# Patient Record
(demographics unavailable — no encounter records)

---

## 2025-03-28 NOTE — DISCUSSION/SUMMARY
[de-identified] : This patient has left hip osteoarthritis that is severe.  I had a discussion with the patient, who is a candidate for total hip replacement. Risks, benefits and alternatives were discussed. At this point, the patient wants to hold off as the pain is significantly more than I would anticipate it is not well-controlled at this time.  I am concerned that performing hip replacement in the setting to be difficult to control pain postoperatively otherwise.  An extensive discussion was conducted on the natural history of the disease and the variety of surgical and non-surgical options available to the patient including, but not limited to non-steroidal anti-inflammatory medications, steroid injections, physical therapy, maintenance of ideal body weight, and reduction of activity.  He will continue with Vicodin and Celebrex.  This patient is taking narcotics preoperatively and understands our narcotic cessation policy and that continued preoperative usage of narcotics will make it more difficult to control postoperative pain. Further, the patient understands that the orthopedic literature demonstrates an increased risk to the patient of periprosthetic joint infection, dissatisfaction, chronic pain, and impaired overall outcome in patients who have preoperative exposure to narcotics prior to total joint replacement. The patient and I formulated a plan to decrease and/or cease narcotic use prior to surgery if possible in order to mitigate some of these risks.  Physical therapy prescribed.  He may consider left hip cortisone injection. The patient will schedule an appointment as needed.

## 2025-03-28 NOTE — PHYSICAL EXAM
[de-identified] : Patient is well nourished, well-developed, in no acute distress, with appropriate mood and affect. The patient is oriented to time, place, and person. Respirations are even and unlabored. Gait evaluation reveals a limp. There is no inguinal adenopathy. Examination of the contralateral hip shows normal range of motion, strength, no tenderness, and intact skin. The affected limb is well-perfused, shows a grossly normal motor and sensory examination. Examination of the hip shows no skin lesions. Hip motion is reduced and causes pain. FADIR is positive and NUHA is positive. Stinchfield test is positive. Leg lengths are approximately equal. Both hips are stable and muscle strength is normal. Pedal pulses are palpable. [de-identified] : AP pelvis, AP and lateral hip radiographs of the left hip were ordered and taken in the office and demonstrate degenerative joint disease of the hip with joint space narrowing, osteophyte formation, and subchondral sclerosis.  The patient presented an MRI of the left hip.  Reviewed imaging with the patient was demonstrates left hip osteoarthritis.

## 2025-03-28 NOTE — HISTORY OF PRESENT ILLNESS
[de-identified] : This is very nice 69-year-old gentleman experiencing left hip pain, which is severe in intensity. The pain substantially limits activities of daily living. Walking tolerance is reduced.  He has had 6 point surgeries.  Pain and restriction of function are intolerable at this time.  He takes Vicodin and Celebrex for his pain management doctor.  He ambulates with a walker.  The patient denies any radiation of the pain to the feet and it is not associated with numbness, tingling, or weakness.

## 2025-05-23 NOTE — HISTORY OF PRESENT ILLNESS
[de-identified] : This is very nice 69-year-old gentleman experiencing left hip pain, which is severe in intensity. The pain substantially limits activities of daily living. Known left hip oa. Walking tolerance is reduced.  He has had 6 spine surgeries.  Pain and restriction of function are intolerable at this time.  He takes Vicodin and Celebrex for his pain management doctor.  He ambulates with a walker.  2x L hip injections helped. The patient denies any radiation of the pain to the feet and it is not associated with numbness, tingling, or weakness.

## 2025-05-23 NOTE — DISCUSSION/SUMMARY
[de-identified] : This patient has left hip osteoarthritis that is severe.  I had a discussion with the patient, who is a candidate for total hip replacement. Risks, benefits and alternatives were discussed. At this point, the patient wants to hold off as the pain is significantly more than I would anticipate it is not well-controlled at this time.  I am concerned that performing hip replacement in the setting to be difficult to control pain postoperatively otherwise.  An extensive discussion was conducted on the natural history of the disease and the variety of surgical and non-surgical options available to the patient including, but not limited to non-steroidal anti-inflammatory medications, steroid injections, physical therapy, maintenance of ideal body weight, and reduction of activity.  He will continue with Vicodin and Celebrex.  This patient is taking narcotics preoperatively and understands our narcotic cessation policy and that continued preoperative usage of narcotics will make it more difficult to control postoperative pain. Further, the patient understands that the orthopedic literature demonstrates an increased risk to the patient of periprosthetic joint infection, dissatisfaction, chronic pain, and impaired overall outcome in patients who have preoperative exposure to narcotics prior to total joint replacement. The patient and I formulated a plan to decrease and/or cease narcotic use prior to surgery if possible in order to mitigate some of these risks.  Physical therapy prescribed. The patient will schedule an appointment as needed.